# Patient Record
Sex: FEMALE | Race: BLACK OR AFRICAN AMERICAN | URBAN - METROPOLITAN AREA
[De-identification: names, ages, dates, MRNs, and addresses within clinical notes are randomized per-mention and may not be internally consistent; named-entity substitution may affect disease eponyms.]

---

## 2018-01-29 ENCOUNTER — APPOINTMENT (OUTPATIENT)
Dept: ULTRASOUND IMAGING | Facility: CLINIC | Age: 33
End: 2018-01-29
Attending: EMERGENCY MEDICINE

## 2018-01-29 ENCOUNTER — HOSPITAL ENCOUNTER (EMERGENCY)
Facility: CLINIC | Age: 33
Discharge: HOME | End: 2018-01-29
Attending: EMERGENCY MEDICINE | Admitting: EMERGENCY MEDICINE

## 2018-01-29 VITALS
HEART RATE: 99 BPM | BODY MASS INDEX: 32.95 KG/M2 | WEIGHT: 193 LBS | RESPIRATION RATE: 18 BRPM | TEMPERATURE: 98.1 F | OXYGEN SATURATION: 100 % | SYSTOLIC BLOOD PRESSURE: 128 MMHG | DIASTOLIC BLOOD PRESSURE: 82 MMHG | HEIGHT: 64 IN

## 2018-01-29 DIAGNOSIS — O26.891 ABDOMINAL PAIN IN PREGNANCY, FIRST TRIMESTER: ICD-10-CM

## 2018-01-29 DIAGNOSIS — R10.9 ABDOMINAL PAIN IN PREGNANCY, FIRST TRIMESTER: ICD-10-CM

## 2018-01-29 LAB
ABO + RH BLD: NORMAL
ABO + RH BLD: NORMAL
ALBUMIN UR-MCNC: NEGATIVE MG/DL
APPEARANCE UR: CLEAR
B-HCG SERPL-ACNC: 1334 IU/L (ref 0–5)
BILIRUB UR QL STRIP: NEGATIVE
COLOR UR AUTO: YELLOW
GLUCOSE UR STRIP-MCNC: NEGATIVE MG/DL
HCG UR QL: POSITIVE
HGB UR QL STRIP: NEGATIVE
KETONES UR STRIP-MCNC: ABNORMAL MG/DL
LEUKOCYTE ESTERASE UR QL STRIP: NEGATIVE
NITRATE UR QL: NEGATIVE
PH UR STRIP: 6.5 PH (ref 5–7)
SOURCE: ABNORMAL
SP GR UR STRIP: 1.02 (ref 1–1.03)
SPECIMEN EXP DATE BLD: NORMAL
UROBILINOGEN UR STRIP-ACNC: 0.2 EU/DL (ref 0.2–1)

## 2018-01-29 PROCEDURE — 84702 CHORIONIC GONADOTROPIN TEST: CPT | Performed by: EMERGENCY MEDICINE

## 2018-01-29 PROCEDURE — 76801 OB US < 14 WKS SINGLE FETUS: CPT

## 2018-01-29 PROCEDURE — 99284 EMERGENCY DEPT VISIT MOD MDM: CPT | Mod: 25

## 2018-01-29 PROCEDURE — 81003 URINALYSIS AUTO W/O SCOPE: CPT | Performed by: EMERGENCY MEDICINE

## 2018-01-29 PROCEDURE — 86901 BLOOD TYPING SEROLOGIC RH(D): CPT | Performed by: EMERGENCY MEDICINE

## 2018-01-29 ASSESSMENT — ENCOUNTER SYMPTOMS
DYSURIA: 0
FATIGUE: 0
HEADACHES: 0
ABDOMINAL DISTENTION: 1
NAUSEA: 1
DIARRHEA: 0
BACK PAIN: 0
HEMATURIA: 0
BLOOD IN STOOL: 0
VOMITING: 0
WOUND: 0
FLANK PAIN: 0
WEAKNESS: 0
APPETITE CHANGE: 0
CONSTIPATION: 0
COUGH: 0
ABDOMINAL PAIN: 1
DIZZINESS: 0
FEVER: 0
DIFFICULTY URINATING: 0
COLOR CHANGE: 0

## 2018-01-29 NOTE — ED AVS SNAPSHOT
Emergency Department    [address] [place] [address]    Phone:  [phone]    Fax:  [phone]                                       Eric López   MRN: [id number]    Department:   Emergency Department   Date of Visit:  1/29/2018           After Visit Summary Signature Page     I have received my discharge instructions, and my questions have been answered. I have discussed any challenges I see with this plan with the nurse or doctor.    ..........................................................................................................................................  Patient/Patient Representative Signature      ..........................................................................................................................................  Patient Representative Print Name and Relationship to Patient    ..................................................               ................................................  Date                                            Time    ..........................................................................................................................................  Reviewed by Signature/Title    ...................................................              ..............................................  Date                                                            Time

## 2018-01-29 NOTE — ED PROVIDER NOTES
"  History     Chief Complaint:  Abdominal Pain    HPI   Eric López is a , 32 year old female who presents with abdominal pain. The patient states she has been experiencing lower abdominal pain for the past two weeks, feeling cramping. The patient reports that she took an at home pregnancy test yesterday which was positive. She reports she has also been experiencing nausea and breast tenderness. She states that she has had regular periods up until last month when on  she experienced a light menstrual cycle with minimal bleeding and at a short length. She denies any fevers, chills, diarrhea, vomiting, dysuria, weakness, vaginal discharge/bleeding, or any other symptoms.    Allergies:  Chocolate    Medications:    The patient is not currently taking any prescribed medications.    Past Medical History:    No significant past medical history.     Past Surgical History:    No pertinent past surgical history.    Family History:    No pertinent family history.    Social History:  Smoking status: Never smoker  Alcohol use: No  Marital Status:  Single     Review of Systems   Constitutional: Negative for appetite change, fatigue and fever.   Eyes: Negative for visual disturbance.   Respiratory: Negative for cough.    Cardiovascular: Negative for chest pain.   Gastrointestinal: Positive for abdominal distention, abdominal pain and nausea. Negative for blood in stool, constipation, diarrhea and vomiting.   Genitourinary: Negative for difficulty urinating, dysuria, flank pain, hematuria, vaginal bleeding, vaginal discharge and vaginal pain.   Musculoskeletal: Negative for back pain.   Skin: Negative for color change and wound.   Neurological: Negative for dizziness, weakness and headaches.   All other systems reviewed and are negative.    Physical Exam     Patient Vitals for the past 24 hrs:   BP Temp Temp src Pulse Resp SpO2 Height Weight   18 1445 144/62 98.1  F (36.7  C) Oral 99 18 100 % 1.626 m (5' 4\") " 87.5 kg (193 lb)         Physical Exam  Eye:  Pupils are equal, round, and reactive.  Extraocular movements intact.    ENT:  No rhinorrhea.  Moist mucus membranes.  Normal tongue and tonsil.    Cardiac:  Regular rate and rhythm.  No murmurs, gallops, or rubs.    Pulmonary:  Clear to auscultation bilaterally.  No wheezes, rales, or rhonchi.    Abdomen:  Positive bowel sounds.  Abdomen is soft and non-distended, without focal tenderness.    Musculoskeletal:  Normal movement of all extremities without evidence for deficit.    Skin:  Warm and dry without rashes.    Neurologic:  Non-focal exam without asymmetric weakness or numbness.     Psychiatric:  Normal affect with appropriate interaction with examiner.      Emergency Department Course   Imaging:  OB US 1st trimester with transvaginal:  IMPRESSION:.  1. No visible intrauterine pregnancy. Thickened endometrial stripe at  3.2 cm.  2. Upper right uterine mass most consistent with a fibroid.  3. With a positive pregnancy test the differential includes early  pregnancy not yet visualized, fetal demise or less likely ectopic  pregnancy.  4. Recommend correlation with serial quantitative beta HCG levels and  short term follow-up ultrasound for further evaluation.  Report per radiology.    Laboratory:  HCG: positive; 1334 (H)    UA: Clear yellow urine, Ketone Trace, otherwise WNL    Rh: O +     Emergency Department Course:  Nursing notes and vitals reviewed.  (3072) I performed an exam of the patient as documented above.    Urine sample was obtained and sent for laboratory analysis, findings above.  The patient was sent for a ultrasound while in the emergency department, findings above.  Blood was drawn from the patient. This was sent for laboratory testing, findings above.      Findings and plan explained to the patient. Patient discharged home with instructions regarding supportive care, medications, and reasons to return. The importance of close follow-up was reviewed.  "  Impression & Plan    Medical Decision Making:  This concerning 32-year-old  presents to us with concerns of having lower abdominal pain in the presence of being pregnant.  She has noticed 2 weeks of increasing breast tenderness, generalized bloating to the lower abdomen, and nausea.  She denies having any focal severe pain or vaginal bleeding.  She took a pregnancy test today which was positive, prompting the visit here to the emergency room.  On my exam, I am unable to elicit any abdominal tenderness.  Because of the early pregnancy and concern for lower abdominal pressure, a beta hCG quantitative level was obtained which is elevated at 1334.  However, her ultrasound does not show any evidence of an IUP.  There is nothing seen in the adnexa or fallopian tubes.  There is a \"mass\" seen in the intrauterine area which is thought to be a fibroid.  I have to wonder if this could represent an early molar pregnancy.  Nonetheless, I spent extensive time with the patient and her  explaining my concerns that she has an elevated hCG but no visualized intrauterine pregnancy.  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  They will be in [place] in 48 hours where I was exceedingly clear that they absolutely must obtain the services of a local emergency room or gynecologist to have a recheck of a beta hCG and possibly to have a repeat ultrasound based on those results.  I explained this could represent intrauterine fetal demise or other ectopic pregnancy.  At this juncture, I do not feel she requires admission and she is otherwise showing no evidence of pain or bleeding.  I do believe that she fully understands that she must follow-up and that she should never hesitate to present to a local emergency department for any worsening of her clinical condition.    Diagnosis:    ICD-10-CM    1. Abdominal pain in pregnancy, first trimester O26.891     R10.9  "       Disposition:  Patient is discharged to home.        I, Harlan Bird, am serving as a scribe on 1/29/2018 at 5:02 PM to personally document services performed by Dr. Trierweiler based on my observations and the provider's statements to me.         Harlan Bird  1/29/2018    EMERGENCY DEPARTMENT       Trierweiler, Chad A, MD  01/29/18 1734

## 2018-01-29 NOTE — ED AVS SNAPSHOT
Emergency Department    [address] [place] [address]    Phone:  [phone]    Fax:  [phone]                                       Eric López   MRN: [id number]    Department:   Emergency Department   Date of Visit:  1/29/2018           Patient Information     Date Of Birth          1985        Your diagnoses for this visit were:     Abdominal pain in pregnancy, first trimester        You were seen by Trierweiler, Chad A, MD.      Follow-up Information     Follow up with Local OB/GYN or Emergency Department In 2 days.    Why:  for a mandatory Beta hCG level recheck        Discharge Instructions         As discussed, it is not exactly clear as to the location of your pregnancy.  You may also be experiencing an early molar pregnancy.  It is exceedingly important you have your beta hCG levels rechecked in 48 hours to determine what direction they are trending and to have repeat ultrasounds to determine how to best proceed.  Your level today is 1334.  You should never hesitate to present to a local emergency department for any increasing pain, bleeding, or any other emergent concerns.      Abdominal Pain and Early Pregnancy    (To rule out ectopic pregnancy or miscarriage)  Our tests show that you are pregnant, but the exact cause of your pain isn t clear.  Some pain and bleeding are common early in pregnancy. Often they stop, and you can go on to have a normal pregnancy and baby. Other times the pain or bleeding can be signs of a miscarriage or ectopic pregnancy. An ectopic pregnancy is a very serious problem. At this time it is unclear if your pregnancy will continue normally, if you will have a miscarriage, or if you could have an ectopic pregnancy. Below is some information about this.  Miscarriage  At this time we don t know whether you will have a miscarriage, or if things will clear up and your pregnancy will continue normally. We understand that this is emotionally  difficult. There is little we can say to change the way you feel. But understand that miscarriages are common.  About 1 or 2 out of every 10 pregnancies end this way. Some end even before you know you are pregnant. This happens for a number of reasons, and usually we never figure out why. It s important you know that it is not your fault. It didn t happen because you did anything wrong.  Having sex or exercising does not cause a miscarriage. These activities are usually safe unless you have pain or bleeding or your doctor tells you to stop. Even minor falls won t cause a miscarriage. Miscarriages happen because things were not developing as they were supposed to. No medicine can prevent a miscarriage.  Ectopic pregnancy  In a normal pregnancy, the fertilized egg attaches to the wall of the womb (uterus). In an ectopic or tubal pregnancy, the fertilized egg attaches outside the uterus, usually in the fallopian tube. Very rarely, the egg attaches to an ovary or somewhere else in the abdomen. An ectopic pregnancy is much less common than a miscarriage, but it is very serious. The baby cannot survive, and as it grows it can rupture the tube. This can cause internal bleeding and even death. Risk factors for an ectopic are:    An ectopic pregnancy in the past    Pelvic inflammatory disease, or PID    Endometriosis    Smoking    An IUD  Additional tests  Because we don t know what s causing your symptoms, you will need more tests to figure out what the problem is. You may need:  Ultrasound  An ultrasound can usually find a normal pregnancy as early as 4 to 5 weeks along. If the ultrasound does not show the baby inside the uterus, it means that:    You have a normal pregnancy less than 4 weeks along, or    You are having or recently had a miscarriage, or    You have an ectopic pregnancy  Quantitative HCG  This test measures the amount of a pregnancy hormone in your blood. Comparing today's test result to a repeat test in 2  days will show whether you have a normal pregnancy.  Laparoscopy  This is a type of surgery. The healthcare provider will put a tube with a light inside your belly (abdomen) to look directly at your pelvic organs. This test is used when it is not safe to wait 2 days for blood test results.  Important information  If you do have an ectopic pregnancy, there is a small chance that the growing fetus can tear the fallopian tube. This can cause severe internal bleeding. If this happens, you may have:    Sudden severe pain in your lower abdomen    Vaginal bleeding    Weakness, dizziness, and sometimes fainting  If any of these symptoms occur:    Call 911 or return immediately to the hospital.    Do not drive yourself.    Do not go to your healthcare provider's office or to a clinic. Go to the hospital.   Home care  Follow these guidelines to help care for yourself at home:    Rest until your next exam. Don t do anything strenuous.    Eat a light diet with foods that are easy to digest.    Don t have sex until your healthcare provider says it s OK.  Follow-up care  Follow up with your healthcare provider, or as advised. If you were told to have a repeat blood test in 2 days, it s important to get it done.  If you had an X-ray or ultrasound, a radiologist will review it. You will be told of any new findings that may affect your care.  Call 911  Call 911 if you have any of these:    Severe pain and very heavy bleeding    Severe lightheadedness, passing out, or fainting    Rapid heart rate    Trouble breathing    Confused or difficulty waking up  When to seek medical advice  Call your healthcare provider right away if any of these occur:    The pain in your abdomen gets worse, either suddenly or gradually.    You are dizzy or weak when you stand.    You have heavy vaginal bleeding. This means soaking 1 pad an hour for 3 hours.    You have vaginal bleeding for more than 5 days.    You have repeated vomiting or diarrhea.    The  pain in your abdomen moves to the lower right.    You have blood in your vomit or bowel movements. This will be dark red or black.    You have a fever of 100.4 F (38 C) or higher, or as directed by your healthcare provider  Date Last Reviewed: 10/1/2016    9039-6757 [employer]. [address] [place] [address]. All rights reserved. This information is not intended as a substitute for professional medical care. Always follow your healthcare professional's instructions.          24 Hour Appointment Hotline       To make an appointment at any [facility], call [phone] ([phone]). If you don't have a family doctor or clinic, we will help you find one. [facility] are conveniently located to serve the needs of you and your family.             Review of your medicines      Notice     You have not been prescribed any medications.            Procedures and tests performed during your visit     *UA reflex to Microscopic    HCG qualitative urine    HCG quantitative pregnancy (blood)    OB  US 1st trimester w transvag    Rh type      Orders Needing Specimen Collection     None      Pending Results     Date and Time Order Name Status Description    1/29/2018 1539 OB  US 1st trimester w transvag Preliminary             Pending Culture Results     No orders found from 1/27/2018 to 1/30/2018.            Pending Results Instructions     If you had any lab results that were not finalized at the time of your Discharge, you can call the ED Lab Result RN at [phone]. You will be contacted by this team for any positive Lab results or changes in treatment. The nurses are available 7 days a week from 10A to 6:30P.  You can leave a message 24 hours per day and they will return your call.        Test Results From Your Hospital Stay        1/29/2018  3:31 PM      Component Results     Component Value Ref Range & Units Status    HCG Qual Urine Positive (A) NEG^Negative Final    This test is  for screening purposes.  Results should be interpreted along with   the clinical picture.  Confirmation testing is available if warranted by   ordering TJB175, HCG Quantitative Pregnancy.           1/29/2018  3:31 PM      Component Results     Component Value Ref Range & Units Status    Color Urine Yellow  Final    Appearance Urine Clear  Final    Glucose Urine Negative NEG^Negative mg/dL Final    Bilirubin Urine Negative NEG^Negative Final    Ketones Urine Trace (A) NEG^Negative mg/dL Final    Specific Gravity Urine 1.025 1.003 - 1.035 Final    Blood Urine Negative NEG^Negative Final    pH Urine 6.5 5.0 - 7.0 pH Final    Protein Albumin Urine Negative NEG^Negative mg/dL Final    Urobilinogen Urine 0.2 0.2 - 1.0 EU/dL Final    Nitrite Urine Negative NEG^Negative Final    Leukocyte Esterase Urine Negative NEG^Negative Final    Source Midstream Urine  Final         1/29/2018  4:52 PM      Component Results     Component Value Ref Range & Units Status    HCG Quantitative Serum 1334 (H) 0 - 5 IU/L Final         1/29/2018  4:39 PM      Component Results     Component    ABO    O    RH(D)    Pos    Specimen Expires    02/01/2018 1/29/2018  4:53 PM      Narrative     OBSTETRIC ULTRASOUND LESS THAN 14 WEEKS WITH TRANSVAGINAL SINGLE  1/29/2018 4:43 PM     INDICATION: Lower abdominal pain, positive pregnancy test.    COMPARISON: None.    FINDINGS: Transabdominal followed by endovaginal ultrasound to assess  for early pregnancy. No visible IUP. Endometrial stripe is thickened  measuring 3.2 cm. There is a 4.2 x 4.0 x 4.1 cm hypoechoic mass in the  upper right uterine fundal region, most compatible with a fibroid. The  ovaries are within normal limits. Dominant follicle in the left ovary.  No hemorrhage, free fluid or other acute findings.        Impression     IMPRESSION:.  1. No visible intrauterine pregnancy. Thickened endometrial stripe at  3.2 cm.  2. Upper right uterine mass most consistent with a fibroid.  3.  With a positive pregnancy test the differential includes early  pregnancy not yet visualized, fetal demise or less likely ectopic  pregnancy.  4. Recommend correlation with serial quantitative beta HCG levels and  short term follow-up ultrasound for further evaluation.                Clinical Quality Measure: Blood Pressure Screening     Your blood pressure was checked while you were in the emergency department today. The last reading we obtained was  BP: 144/62 . Please read the guidelines below about what these numbers mean and what you should do about them.  If your systolic blood pressure (the top number) is less than 120 and your diastolic blood pressure (the bottom number) is less than 80, then your blood pressure is normal. There is nothing more that you need to do about it.  If your systolic blood pressure (the top number) is 120-139 or your diastolic blood pressure (the bottom number) is 80-89, your blood pressure may be higher than it should be. You should have your blood pressure rechecked within a year by a primary care provider.  If your systolic blood pressure (the top number) is 140 or greater or your diastolic blood pressure (the bottom number) is 90 or greater, you may have high blood pressure. High blood pressure is treatable, but if left untreated over time it can put you at risk for heart attack, stroke, or kidney failure. You should have your blood pressure rechecked by a primary care provider within the next 4 weeks.  If your provider in the emergency department today gave you specific instructions to follow-up with your doctor or provider even sooner than that, you should follow that instruction and not wait for up to 4 weeks for your follow-up visit.        Thank you for choosing [facility]       Thank you for choosing [facility] for your care. Our goal is always to provide you with excellent care. Hearing back from our patients is one way we can continue to improve our services. Please take a  "few minutes to complete the written survey that you may receive in the mail after you visit with us. Thank you!        [employer] Information     [employer] lets you send messages to your doctor, view your test results, renew your prescriptions, schedule appointments and more. To sign up, go to [email or web address] . Click on \"Log in\" on the left side of the screen, which will take you to the Welcome page. Then click on \"Sign up Now\" on the right side of the page.     You will be asked to enter the access code listed below, as well as some personal information. Please follow the directions to create your username and password.     Your access code is: [email or web address]  Expires: 2018  5:37 PM     Your access code will  in 90 days. If you need help or a new code, please call your [facility] clinic or [phone].        Care EveryWhere ID     This is your Care EveryWhere ID. This could be used by other organizations to access your [facility] medical records  [id number]        Equal Access to Services     [facility]: Hadii alana Fleming, waaxda luhoward, qaybta kaalfartun de jesus, angela fisher . So [facility] [place] [phone].    ATENCIÓN: Si habla español, tiene a andino disposición servicios gratuitos de asistencia lingüística. Llame al [phone].    We comply with applicable federal civil rights laws and [place] laws. We do not discriminate on the basis of race, color, national origin, age, disability, sex, sexual orientation, or gender identity.            After Visit Summary       This is your record. Keep this with you and show to your community pharmacist(s) and doctor(s) at your next visit.                  "

## 2018-01-29 NOTE — DISCHARGE INSTRUCTIONS
As discussed, it is not exactly clear as to the location of your pregnancy.  You may also be experiencing an early molar pregnancy.  It is exceedingly important you have your beta hCG levels rechecked in 48 hours to determine what direction they are trending and to have repeat ultrasounds to determine how to best proceed.  Your level today is 1334.  You should never hesitate to present to a local emergency department for any increasing pain, bleeding, or any other emergent concerns.      Abdominal Pain and Early Pregnancy    (To rule out ectopic pregnancy or miscarriage)  Our tests show that you are pregnant, but the exact cause of your pain isn t clear.  Some pain and bleeding are common early in pregnancy. Often they stop, and you can go on to have a normal pregnancy and baby. Other times the pain or bleeding can be signs of a miscarriage or ectopic pregnancy. An ectopic pregnancy is a very serious problem. At this time it is unclear if your pregnancy will continue normally, if you will have a miscarriage, or if you could have an ectopic pregnancy. Below is some information about this.  Miscarriage  At this time we don t know whether you will have a miscarriage, or if things will clear up and your pregnancy will continue normally. We understand that this is emotionally difficult. There is little we can say to change the way you feel. But understand that miscarriages are common.  About 1 or 2 out of every 10 pregnancies end this way. Some end even before you know you are pregnant. This happens for a number of reasons, and usually we never figure out why. It s important you know that it is not your fault. It didn t happen because you did anything wrong.  Having sex or exercising does not cause a miscarriage. These activities are usually safe unless you have pain or bleeding or your doctor tells you to stop. Even minor falls won t cause a miscarriage. Miscarriages happen because things were not developing as they  were supposed to. No medicine can prevent a miscarriage.  Ectopic pregnancy  In a normal pregnancy, the fertilized egg attaches to the wall of the womb (uterus). In an ectopic or tubal pregnancy, the fertilized egg attaches outside the uterus, usually in the fallopian tube. Very rarely, the egg attaches to an ovary or somewhere else in the abdomen. An ectopic pregnancy is much less common than a miscarriage, but it is very serious. The baby cannot survive, and as it grows it can rupture the tube. This can cause internal bleeding and even death. Risk factors for an ectopic are:    An ectopic pregnancy in the past    Pelvic inflammatory disease, or PID    Endometriosis    Smoking    An IUD  Additional tests  Because we don t know what s causing your symptoms, you will need more tests to figure out what the problem is. You may need:  Ultrasound  An ultrasound can usually find a normal pregnancy as early as 4 to 5 weeks along. If the ultrasound does not show the baby inside the uterus, it means that:    You have a normal pregnancy less than 4 weeks along, or    You are having or recently had a miscarriage, or    You have an ectopic pregnancy  Quantitative HCG  This test measures the amount of a pregnancy hormone in your blood. Comparing today's test result to a repeat test in 2 days will show whether you have a normal pregnancy.  Laparoscopy  This is a type of surgery. The healthcare provider will put a tube with a light inside your belly (abdomen) to look directly at your pelvic organs. This test is used when it is not safe to wait 2 days for blood test results.  Important information  If you do have an ectopic pregnancy, there is a small chance that the growing fetus can tear the fallopian tube. This can cause severe internal bleeding. If this happens, you may have:    Sudden severe pain in your lower abdomen    Vaginal bleeding    Weakness, dizziness, and sometimes fainting  If any of these symptoms occur:    Call  911 or return immediately to the hospital.    Do not drive yourself.    Do not go to your healthcare provider's office or to a clinic. Go to the hospital.   Home care  Follow these guidelines to help care for yourself at home:    Rest until your next exam. Don t do anything strenuous.    Eat a light diet with foods that are easy to digest.    Don t have sex until your healthcare provider says it s OK.  Follow-up care  Follow up with your healthcare provider, or as advised. If you were told to have a repeat blood test in 2 days, it s important to get it done.  If you had an X-ray or ultrasound, a radiologist will review it. You will be told of any new findings that may affect your care.  Call 911  Call 911 if you have any of these:    Severe pain and very heavy bleeding    Severe lightheadedness, passing out, or fainting    Rapid heart rate    Trouble breathing    Confused or difficulty waking up  When to seek medical advice  Call your healthcare provider right away if any of these occur:    The pain in your abdomen gets worse, either suddenly or gradually.    You are dizzy or weak when you stand.    You have heavy vaginal bleeding. This means soaking 1 pad an hour for 3 hours.    You have vaginal bleeding for more than 5 days.    You have repeated vomiting or diarrhea.    The pain in your abdomen moves to the lower right.    You have blood in your vomit or bowel movements. This will be dark red or black.    You have a fever of 100.4 F (38 C) or higher, or as directed by your healthcare provider  Date Last Reviewed: 10/1/2016    [id number] [employer]. [address] [place] [address], [place] [address]. All rights reserved. This information is not intended as a substitute for professional medical care. Always follow your healthcare professional's instructions.